# Patient Record
Sex: FEMALE | Race: WHITE | Employment: OTHER | URBAN - METROPOLITAN AREA
[De-identification: names, ages, dates, MRNs, and addresses within clinical notes are randomized per-mention and may not be internally consistent; named-entity substitution may affect disease eponyms.]

---

## 2017-02-27 ENCOUNTER — TELEPHONE (OUTPATIENT)
Dept: OBGYN CLINIC | Facility: CLINIC | Age: 47
End: 2017-02-27

## 2017-02-27 RX ORDER — DESOGESTREL AND ETHINYL ESTRADIOL 0.15-0.03
1 KIT ORAL DAILY
Qty: 1 PACKAGE | Refills: 0 | Status: SHIPPED | OUTPATIENT
Start: 2017-02-27 | End: 2017-07-05

## 2017-02-27 RX ORDER — DESOGESTREL AND ETHINYL ESTRADIOL 0.15-0.03
1 KIT ORAL DAILY
Qty: 3 PACKAGE | Refills: 1 | Status: SHIPPED | OUTPATIENT
Start: 2017-02-27 | End: 2017-08-25

## 2017-02-27 NOTE — TELEPHONE ENCOUNTER
Patient normally gets her script through mail order; however, she gets a headache on the one they sent her for substitution   She thinks Dr. Manny Hobbs checked off that it can be substituted.   Can we please send one month to [place] on  [facility] [address] in [place] for

## 2017-02-27 NOTE — TELEPHONE ENCOUNTER
Pt requesting one month VALE Skaggs, be sent to [place]. Requesting 3 month VALE Skaggs, be sent to mail order. Pt due for annual in April. Will call tomorrow to schedule. Meds ordered.

## 2017-07-05 ENCOUNTER — TELEPHONE (OUTPATIENT)
Dept: OBGYN CLINIC | Facility: CLINIC | Age: 47
End: 2017-07-05

## 2017-07-05 RX ORDER — DESOGESTREL AND ETHINYL ESTRADIOL 0.15-0.03
1 KIT ORAL DAILY
Qty: 1 PACKAGE | Refills: 1 | Status: SHIPPED | OUTPATIENT
Start: 2017-07-05 | End: 2017-08-25

## 2017-07-05 NOTE — TELEPHONE ENCOUNTER
Pt calling and needs a  Short term RX to go to her local pharmacy until her Annual in auguast    Can we order?

## 2017-08-25 ENCOUNTER — OFFICE VISIT (OUTPATIENT)
Dept: OBGYN CLINIC | Facility: CLINIC | Age: 47
End: 2017-08-25

## 2017-08-25 VITALS
WEIGHT: 169 LBS | HEIGHT: 65 IN | DIASTOLIC BLOOD PRESSURE: 72 MMHG | BODY MASS INDEX: 28.16 KG/M2 | SYSTOLIC BLOOD PRESSURE: 118 MMHG | HEART RATE: 59 BPM

## 2017-08-25 DIAGNOSIS — Z01.419 WELL WOMAN EXAM WITH ROUTINE GYNECOLOGICAL EXAM: Primary | ICD-10-CM

## 2017-08-25 DIAGNOSIS — Z12.4 ROUTINE PAPANICOLAOU SMEAR: ICD-10-CM

## 2017-08-25 PROCEDURE — 99396 PREV VISIT EST AGE 40-64: CPT | Performed by: OBSTETRICS & GYNECOLOGY

## 2017-08-25 PROCEDURE — 88175 CYTOPATH C/V AUTO FLUID REDO: CPT | Performed by: OBSTETRICS & GYNECOLOGY

## 2017-08-25 PROCEDURE — 87624 HPV HI-RISK TYP POOLED RSLT: CPT | Performed by: OBSTETRICS & GYNECOLOGY

## 2017-08-25 RX ORDER — DESOGESTREL AND ETHINYL ESTRADIOL 0.15-0.03
1 KIT ORAL DAILY
Qty: 3 PACKAGE | Refills: 3 | Status: SHIPPED | OUTPATIENT
Start: 2017-08-25 | End: 2019-04-23

## 2017-08-30 LAB — HPV I/H RISK 1 DNA SPEC QL NAA+PROBE: NEGATIVE

## 2017-08-31 ENCOUNTER — TELEPHONE (OUTPATIENT)
Dept: OBGYN CLINIC | Facility: CLINIC | Age: 47
End: 2017-08-31

## 2017-08-31 NOTE — TELEPHONE ENCOUNTER
Received fax request for clarification on order for OCP. Spoke with Gato Enriquez, pharmacist at [facility]. Per chart review, ok for generic of Enskyce.

## 2017-09-29 PROCEDURE — 87338 HPYLORI STOOL AG IA: CPT | Performed by: FAMILY MEDICINE

## 2017-10-30 ENCOUNTER — CHARTING TRANS (OUTPATIENT)
Dept: OTHER | Age: 47
End: 2017-10-30

## 2017-11-10 PROCEDURE — 82652 VIT D 1 25-DIHYDROXY: CPT | Performed by: FAMILY MEDICINE

## 2017-12-13 ENCOUNTER — TELEPHONE (OUTPATIENT)
Dept: OBGYN CLINIC | Facility: CLINIC | Age: 47
End: 2017-12-13

## 2017-12-13 RX ORDER — DESOGESTREL AND ETHINYL ESTRADIOL 0.15-0.03
1 KIT ORAL DAILY
Qty: 1 PACKAGE | Refills: 0 | Status: SHIPPED | OUTPATIENT
Start: 2017-12-13 | End: 2018-02-10

## 2017-12-13 RX ORDER — DESOGESTREL AND ETHINYL ESTRADIOL 0.15-0.03
1 KIT ORAL DAILY
Qty: 3 PACKAGE | Refills: 3 | Status: SHIPPED | OUTPATIENT
Start: 2017-12-13 | End: 2018-02-10

## 2017-12-13 NOTE — TELEPHONE ENCOUNTER
PT called this morning and states she wants to change back to Reclipsen.      PT states she needs the medications sent to her mail order pharmacy- [facility] but will need 1 prescription sent to her local pharmacy ([facility]) since she only has a week left on

## 2017-12-13 NOTE — TELEPHONE ENCOUNTER
Contacted patient. She was given generic Reclipsen with her last Rx. Has tried it and does not like it. Would like to go back to brand name. [facility] pack and remainder of rx sent to [facility].

## 2018-11-02 VITALS — TEMPERATURE: 98 F

## 2018-12-20 RX ORDER — DESOGESTREL AND ETHINYL ESTRADIOL 0.15-0.03
1 KIT ORAL DAILY
Qty: 3 PACKAGE | Refills: 0 | Status: SHIPPED | OUTPATIENT
Start: 2018-12-20 | End: 2018-12-21

## 2018-12-20 NOTE — TELEPHONE ENCOUNTER
Patient has not been seen since 08/2017. She has annual scheduled in February. OK to send refill to last until annual?  Please advise.

## 2018-12-20 NOTE — TELEPHONE ENCOUNTER
Pt states she never got the medication (name brand) sent to either the local [facility] (for the 1 month supply)  Or the [facility] in pharmacy for the remainder  Verified that the [facility] [address] listed is correct and also that the [facility] in pharmacy is correct

## 2018-12-21 ENCOUNTER — TELEPHONE (OUTPATIENT)
Dept: OBGYN CLINIC | Facility: CLINIC | Age: 48
End: 2018-12-21

## 2018-12-21 RX ORDER — DESOGESTREL AND ETHINYL ESTRADIOL 0.15-0.03
1 KIT ORAL DAILY
Qty: 3 PACKAGE | Refills: 0 | Status: SHIPPED | OUTPATIENT
Start: 2018-12-21 | End: 2018-12-21

## 2018-12-21 RX ORDER — DESOGESTREL AND ETHINYL ESTRADIOL 0.15-0.03
1 KIT ORAL DAILY
Qty: 3 PACKAGE | Refills: 0 | Status: SHIPPED | OUTPATIENT
Start: 2018-12-21 | End: 2019-04-23

## 2018-12-21 NOTE — TELEPHONE ENCOUNTER
Please resend RX to [facility] to Diane Pederson    Also send a 30 day supply to [facility] on [address] in [place]    Pt will not get in time from Mail order    Please send ASAP

## 2019-04-23 ENCOUNTER — TELEPHONE (OUTPATIENT)
Dept: OBGYN CLINIC | Facility: CLINIC | Age: 49
End: 2019-04-23

## 2019-04-23 RX ORDER — DESOGESTREL AND ETHINYL ESTRADIOL 0.15-0.03
1 KIT ORAL DAILY
Qty: 3 PACKAGE | Refills: 0 | Status: SHIPPED | OUTPATIENT
Start: 2019-04-23 | End: 2019-05-23

## 2019-04-23 NOTE — TELEPHONE ENCOUNTER
Pt had annual scheduled with Dr Amanda Schulz  It has been rescheduled to Dr Tejinder Young   Date Time Provider Natalee Deleon   6/24/2019 11:00 AM Joselin Lovett MD [facility]     Pt need a refill to get her thru to her new apt date

## 2019-05-22 ENCOUNTER — TELEPHONE (OUTPATIENT)
Dept: OBGYN CLINIC | Facility: CLINIC | Age: 49
End: 2019-05-22

## 2019-05-22 NOTE — TELEPHONE ENCOUNTER
Pt calling and was given a New birth control from the pharmacy due to phasing our Reclipsen.     Pt says the medication is getting headaches and cramping    Pt needs a NEW RX for Reclipson so she can get that actual medication

## 2019-05-23 RX ORDER — DESOGESTREL AND ETHINYL ESTRADIOL 0.15-0.03
1 KIT ORAL DAILY
Qty: 3 PACKAGE | Refills: 0 | Status: SHIPPED | OUTPATIENT
Start: 2019-05-23 | End: 2019-06-24

## 2019-05-23 NOTE — TELEPHONE ENCOUNTER
Patient states she has bad reactions to generic substitute for Reclipsen. She was giving Isibloom and had headache and cramping. Pharmacy stated they have Reclipsen in stock.    Last OV date: 08/25/17  Recent Test/Labs: NA   Recommendations: Sent to [facility]

## 2019-06-19 ENCOUNTER — HOSPITAL ENCOUNTER (OUTPATIENT)
Age: 49
Discharge: HOME | End: 2019-06-19
Payer: COMMERCIAL

## 2019-06-19 VITALS
TEMPERATURE: 98 F | RESPIRATION RATE: 16 BRPM | SYSTOLIC BLOOD PRESSURE: 141 MMHG | OXYGEN SATURATION: 98 % | DIASTOLIC BLOOD PRESSURE: 85 MMHG | HEART RATE: 71 BPM

## 2019-06-19 DIAGNOSIS — L03.019 ONYCHIA AND PARONYCHIA OF FINGER: Primary | ICD-10-CM

## 2019-06-19 PROCEDURE — 10060 I&D ABSCESS SIMPLE/SINGLE: CPT

## 2019-06-19 PROCEDURE — 87077 CULTURE AEROBIC IDENTIFY: CPT | Performed by: PHYSICIAN ASSISTANT

## 2019-06-19 PROCEDURE — 99204 OFFICE O/P NEW MOD 45 MIN: CPT

## 2019-06-19 PROCEDURE — 87070 CULTURE OTHR SPECIMN AEROBIC: CPT | Performed by: PHYSICIAN ASSISTANT

## 2019-06-19 PROCEDURE — 87186 SC STD MICRODIL/AGAR DIL: CPT | Performed by: PHYSICIAN ASSISTANT

## 2019-06-19 PROCEDURE — 99214 OFFICE O/P EST MOD 30 MIN: CPT

## 2019-06-19 PROCEDURE — 87205 SMEAR GRAM STAIN: CPT | Performed by: PHYSICIAN ASSISTANT

## 2019-06-19 RX ORDER — CEPHALEXIN 500 MG/1
500 CAPSULE ORAL 3 TIMES DAILY
Qty: 21 CAPSULE | Refills: 0 | Status: SHIPPED | OUTPATIENT
Start: 2019-06-19 | End: 2019-06-24 | Stop reason: ALTCHOICE

## 2019-06-19 NOTE — ED PROVIDER NOTES
Patient Seen in: [facility]    History   Patient presents with:  Redness  Finger Pain    Stated Complaint: finger red,swollen,right hand    HPI    Patient is a pleasant 62-year-old female. She is right-hand dominant.   She works 141/85   Pulse 71   Resp 16   Temp 98.2 °F (36.8 °C)   Temp src Oral   SpO2 98 %   O2 Device None (Room air)       Current:/85   Pulse 71   Temp 98.2 °F (36.8 °C) (Oral)   Resp 16   LMP 06/12/2019 (Exact Date)   SpO2 98%         Physical Exam    Gen:

## 2019-06-21 NOTE — ED NOTES
Spoke w/ patient, informed her of her wound culture results. Patient was reeducated about wound care. Verbalizes understanding. Sts that her finger is improving and the redness has almost completely resolved.  All questions and concerns were answered at [facility]

## 2019-06-24 ENCOUNTER — OFFICE VISIT (OUTPATIENT)
Dept: OBGYN CLINIC | Facility: CLINIC | Age: 49
End: 2019-06-24
Payer: COMMERCIAL

## 2019-06-24 VITALS
WEIGHT: 177 LBS | HEART RATE: 55 BPM | HEIGHT: 65 IN | DIASTOLIC BLOOD PRESSURE: 76 MMHG | SYSTOLIC BLOOD PRESSURE: 124 MMHG | BODY MASS INDEX: 29.49 KG/M2

## 2019-06-24 DIAGNOSIS — Z01.419 WELL WOMAN EXAM WITH ROUTINE GYNECOLOGICAL EXAM: Primary | ICD-10-CM

## 2019-06-24 PROCEDURE — 99396 PREV VISIT EST AGE 40-64: CPT | Performed by: OBSTETRICS & GYNECOLOGY

## 2019-06-24 RX ORDER — DESOGESTREL AND ETHINYL ESTRADIOL 0.15-0.03
1 KIT ORAL DAILY
Qty: 3 PACKAGE | Refills: 3 | Status: SHIPPED | OUTPATIENT
Start: 2019-06-24 | End: 2020-07-06

## 2019-06-24 NOTE — PROGRESS NOTES
Annual  No C/O  No problem with OC  No menopausal symptoms  Kids are well    ROS: No Cardiac, Respiratory, GI,  or Neurological symptoms.     PE:  GENERAL: well developed, well nourished, in no apparent distress alert oriented x 3  SKIN: no rashes, no ana

## 2020-03-23 RX ORDER — DESOGESTREL AND ETHINYL ESTRADIOL 0.15-0.03
1 KIT ORAL DAILY
Qty: 3 PACKAGE | Refills: 3 | OUTPATIENT
Start: 2020-03-23

## 2020-06-29 NOTE — TELEPHONE ENCOUNTER
Last OV: 6/24/19 with Dr. Esther Desai for annual  Last refill date: 6/24/19  Follow-up: 1 year  Next appt. : 7/3/20 with Leticia Thomas       Call to patient; no answer. Left message on VM requesting call back to office. Need to know if she needs refill before visit scheduled for Friday.

## 2020-07-03 ENCOUNTER — OFFICE VISIT (OUTPATIENT)
Dept: OBGYN CLINIC | Facility: CLINIC | Age: 50
End: 2020-07-03
Payer: COMMERCIAL

## 2020-07-03 VITALS
DIASTOLIC BLOOD PRESSURE: 76 MMHG | HEART RATE: 67 BPM | WEIGHT: 180.81 LBS | HEIGHT: 65 IN | SYSTOLIC BLOOD PRESSURE: 122 MMHG | BODY MASS INDEX: 30.12 KG/M2

## 2020-07-03 DIAGNOSIS — Z12.31 ENCOUNTER FOR SCREENING MAMMOGRAM FOR MALIGNANT NEOPLASM OF BREAST: ICD-10-CM

## 2020-07-03 DIAGNOSIS — N63.0 LUMP OR MASS IN BREAST: ICD-10-CM

## 2020-07-03 DIAGNOSIS — Z12.4 CERVICAL CANCER SCREENING: ICD-10-CM

## 2020-07-03 DIAGNOSIS — Z01.419 WELL WOMAN EXAM WITH ROUTINE GYNECOLOGICAL EXAM: Primary | ICD-10-CM

## 2020-07-03 DIAGNOSIS — N64.4 BREAST PAIN: ICD-10-CM

## 2020-07-03 PROCEDURE — 99396 PREV VISIT EST AGE 40-64: CPT | Performed by: NURSE PRACTITIONER

## 2020-07-03 PROCEDURE — 87624 HPV HI-RISK TYP POOLED RSLT: CPT | Performed by: NURSE PRACTITIONER

## 2020-07-03 NOTE — PROGRESS NOTES
Here for Routine Annual Exam  She has had discomfort, stinging sensation to area on left breast where they have been imaging breast mass. Imaging has been benign over the last few years but the area has become increasingly tender.   Menses are regular,

## 2020-07-06 ENCOUNTER — TELEPHONE (OUTPATIENT)
Dept: OBGYN CLINIC | Facility: CLINIC | Age: 50
End: 2020-07-06

## 2020-07-06 RX ORDER — DESOGESTREL AND ETHINYL ESTRADIOL 0.15-0.03
1 KIT ORAL DAILY
Qty: 3 PACKAGE | Refills: 3 | Status: SHIPPED | OUTPATIENT
Start: 2020-07-06 | End: 2021-06-07

## 2020-07-06 NOTE — TELEPHONE ENCOUNTER
Last OV: 7/3/20 with Palmira Campos for annual  Last refill date: 6/24/19  Follow-up: 1 year  Next appt. : 7/9/21    Refill sent to get patient to next appt due.

## 2020-07-07 LAB — HPV I/H RISK 1 DNA SPEC QL NAA+PROBE: NEGATIVE

## 2020-08-04 ENCOUNTER — APPOINTMENT (OUTPATIENT)
Dept: GENERAL RADIOLOGY | Facility: HOSPITAL | Age: 50
End: 2020-08-04
Payer: COMMERCIAL

## 2020-08-04 ENCOUNTER — APPOINTMENT (OUTPATIENT)
Dept: CT IMAGING | Facility: HOSPITAL | Age: 50
End: 2020-08-04
Attending: EMERGENCY MEDICINE
Payer: COMMERCIAL

## 2020-08-04 ENCOUNTER — HOSPITAL ENCOUNTER (EMERGENCY)
Facility: HOSPITAL | Age: 50
Discharge: HOME | End: 2020-08-04
Attending: EMERGENCY MEDICINE
Payer: COMMERCIAL

## 2020-08-04 VITALS
RESPIRATION RATE: 16 BRPM | DIASTOLIC BLOOD PRESSURE: 98 MMHG | HEIGHT: 65 IN | HEART RATE: 76 BPM | OXYGEN SATURATION: 98 % | SYSTOLIC BLOOD PRESSURE: 123 MMHG | WEIGHT: 180 LBS | BODY MASS INDEX: 29.99 KG/M2 | TEMPERATURE: 98 F

## 2020-08-04 DIAGNOSIS — R10.9 ABDOMINAL PAIN OF UNKNOWN ETIOLOGY: Primary | ICD-10-CM

## 2020-08-04 DIAGNOSIS — K52.9 COLITIS: ICD-10-CM

## 2020-08-04 LAB
ALBUMIN SERPL-MCNC: 3.5 G/DL (ref 3.4–5)
ALBUMIN/GLOB SERPL: 0.8 {RATIO} (ref 1–2)
ALP LIVER SERPL-CCNC: 64 U/L (ref 39–100)
ALT SERPL-CCNC: 22 U/L (ref 13–56)
ANION GAP SERPL CALC-SCNC: 1 MMOL/L (ref 0–18)
AST SERPL-CCNC: 18 U/L (ref 15–37)
BASOPHILS # BLD AUTO: 0.04 X10(3) UL (ref 0–0.2)
BASOPHILS NFR BLD AUTO: 0.7 %
BILIRUB SERPL-MCNC: 0.4 MG/DL (ref 0.1–2)
BILIRUB UR QL STRIP.AUTO: NEGATIVE
BUN BLD-MCNC: 6 MG/DL (ref 7–18)
BUN/CREAT SERPL: 6.5 (ref 10–20)
CALCIUM BLD-MCNC: 8.9 MG/DL (ref 8.5–10.1)
CHLORIDE SERPL-SCNC: 107 MMOL/L (ref 98–112)
CLARITY UR REFRACT.AUTO: CLEAR
CO2 SERPL-SCNC: 28 MMOL/L (ref 21–32)
CREAT BLD-MCNC: 0.92 MG/DL (ref 0.55–1.02)
DEPRECATED RDW RBC AUTO: 40.9 FL (ref 35.1–46.3)
EOSINOPHIL # BLD AUTO: 0.14 X10(3) UL (ref 0–0.7)
EOSINOPHIL NFR BLD AUTO: 2.5 %
ERYTHROCYTE [DISTWIDTH] IN BLOOD BY AUTOMATED COUNT: 12.3 % (ref 11–15)
GLOBULIN PLAS-MCNC: 4.2 G/DL (ref 2.8–4.4)
GLUCOSE BLD-MCNC: 119 MG/DL (ref 70–99)
GLUCOSE UR STRIP.AUTO-MCNC: NEGATIVE MG/DL
HCT VFR BLD AUTO: 39.2 % (ref 35–48)
HGB BLD-MCNC: 12.8 G/DL (ref 12–16)
IMM GRANULOCYTES # BLD AUTO: 0.01 X10(3) UL (ref 0–1)
IMM GRANULOCYTES NFR BLD: 0.2 %
KETONES UR STRIP.AUTO-MCNC: NEGATIVE MG/DL
LEUKOCYTE ESTERASE UR QL STRIP.AUTO: NEGATIVE
LIPASE SERPL-CCNC: 70 U/L (ref 73–393)
LYMPHOCYTES # BLD AUTO: 1.19 X10(3) UL (ref 1–4)
LYMPHOCYTES NFR BLD AUTO: 21.2 %
M PROTEIN MFR SERPL ELPH: 7.7 G/DL (ref 6.4–8.2)
MCH RBC QN AUTO: 29.4 PG (ref 26–34)
MCHC RBC AUTO-ENTMCNC: 32.7 G/DL (ref 31–37)
MCV RBC AUTO: 90.1 FL (ref 80–100)
MONOCYTES # BLD AUTO: 0.44 X10(3) UL (ref 0.1–1)
MONOCYTES NFR BLD AUTO: 7.8 %
NEUTROPHILS # BLD AUTO: 3.79 X10 (3) UL (ref 1.5–7.7)
NEUTROPHILS # BLD AUTO: 3.79 X10(3) UL (ref 1.5–7.7)
NEUTROPHILS NFR BLD AUTO: 67.6 %
NITRITE UR QL STRIP.AUTO: NEGATIVE
OSMOLALITY SERPL CALC.SUM OF ELEC: 281 MOSM/KG (ref 275–295)
PH UR STRIP.AUTO: 6 [PH] (ref 4.5–8)
PLATELET # BLD AUTO: 228 10(3)UL (ref 150–450)
POTASSIUM SERPL-SCNC: 3.8 MMOL/L (ref 3.5–5.1)
PROT UR STRIP.AUTO-MCNC: NEGATIVE MG/DL
RBC # BLD AUTO: 4.35 X10(6)UL (ref 3.8–5.3)
SODIUM SERPL-SCNC: 136 MMOL/L (ref 136–145)
SP GR UR STRIP.AUTO: 1.02 (ref 1–1.03)
UROBILINOGEN UR STRIP.AUTO-MCNC: <2 MG/DL
WBC # BLD AUTO: 5.6 X10(3) UL (ref 4–11)

## 2020-08-04 PROCEDURE — 74177 CT ABD & PELVIS W/CONTRAST: CPT | Performed by: EMERGENCY MEDICINE

## 2020-08-04 PROCEDURE — 71045 X-RAY EXAM CHEST 1 VIEW: CPT

## 2020-08-04 PROCEDURE — 36415 COLL VENOUS BLD VENIPUNCTURE: CPT

## 2020-08-04 PROCEDURE — 80053 COMPREHEN METABOLIC PANEL: CPT

## 2020-08-04 PROCEDURE — 85025 COMPLETE CBC W/AUTO DIFF WBC: CPT

## 2020-08-04 PROCEDURE — 99284 EMERGENCY DEPT VISIT MOD MDM: CPT

## 2020-08-04 PROCEDURE — 81001 URINALYSIS AUTO W/SCOPE: CPT | Performed by: EMERGENCY MEDICINE

## 2020-08-04 PROCEDURE — 85025 COMPLETE CBC W/AUTO DIFF WBC: CPT | Performed by: EMERGENCY MEDICINE

## 2020-08-04 PROCEDURE — 83690 ASSAY OF LIPASE: CPT | Performed by: EMERGENCY MEDICINE

## 2020-08-04 PROCEDURE — 80053 COMPREHEN METABOLIC PANEL: CPT | Performed by: EMERGENCY MEDICINE

## 2020-08-04 NOTE — ED INITIAL ASSESSMENT (HPI)
Pt to ED with complaints of abdominal pain, worsening today but has been present for 10 days. Pt reports pain began more generalized and is now in the lower quadrants.

## 2020-08-04 NOTE — ED PROVIDER NOTES
Patient Seen in: [facility] Emergency Department      History   Patient presents with:  Abdomen/Flank Pain    Stated Complaint: abdominal pain x10 days, worse today in lower abdomen     HPI    This is a 69-year-old female who arrives here with compl SpO2 98 %   O2 Device None (Room air)       Current:/90   Pulse 102   Temp 98.2 °F (36.8 °C) (Oral)   Resp 17   Ht 165.1 cm (5' 5\")   Wt 81.6 kg   LMP 08/04/2020   SpO2 98%   BMI 29.95 kg/m²         Physical Exam  General: .   Female in some mild t Final result                 Please view results for these tests on the individual orders.    RAINBOW DRAW BLUE   RAINBOW DRAW LAVENDER   RAINBOW DRAW LIGHT GREEN   RAINBOW DRAW GOLD   CBC W/ DIFFERENTIAL          The patient was placed on mon approximate mid ascending colon involving the hepatic flexure, transverse colon, splenic flexure, and proximal descending colon, terminating around the mid descending colon without significant involvement of the sigmoid colon or rectum.   The areas of invol Lizbeth Hollingsworth [id number]  [phone]    In 2 days            Medications Prescribed:  Current Discharge Medication List

## 2020-08-06 ENCOUNTER — LAB ENCOUNTER (OUTPATIENT)
Dept: LAB | Facility: HOSPITAL | Age: 50
End: 2020-08-06
Attending: INTERNAL MEDICINE
Payer: COMMERCIAL

## 2020-08-06 DIAGNOSIS — K52.9 COLITIS: ICD-10-CM

## 2020-08-06 PROBLEM — R92.30 DENSE BREASTS: Status: ACTIVE | Noted: 2017-03-13

## 2020-08-06 PROBLEM — R92.8 ABNORMAL ULTRASOUND OF BREAST: Status: ACTIVE | Noted: 2017-03-13

## 2020-08-06 PROBLEM — R92.2 DENSE BREASTS: Status: ACTIVE | Noted: 2017-03-13

## 2020-08-06 LAB — C DIFF TOX B STL QL: NEGATIVE

## 2020-08-06 PROCEDURE — 87046 STOOL CULTR AEROBIC BACT EA: CPT

## 2020-08-06 PROCEDURE — 87493 C DIFF AMPLIFIED PROBE: CPT

## 2020-08-06 PROCEDURE — 87045 FECES CULTURE AEROBIC BACT: CPT

## 2020-08-06 PROCEDURE — 87507 IADNA-DNA/RNA PROBE TQ 12-25: CPT | Performed by: INTERNAL MEDICINE

## 2020-08-06 PROCEDURE — 87427 SHIGA-LIKE TOXIN AG IA: CPT

## 2020-08-06 PROCEDURE — 82272 OCCULT BLD FECES 1-3 TESTS: CPT

## 2020-08-09 ENCOUNTER — APPOINTMENT (OUTPATIENT)
Dept: CT IMAGING | Facility: HOSPITAL | Age: 50
End: 2020-08-09
Attending: EMERGENCY MEDICINE
Payer: COMMERCIAL

## 2020-08-09 ENCOUNTER — HOSPITAL ENCOUNTER (EMERGENCY)
Facility: HOSPITAL | Age: 50
Discharge: HOME | End: 2020-08-09
Attending: EMERGENCY MEDICINE
Payer: COMMERCIAL

## 2020-08-09 VITALS
WEIGHT: 180 LBS | DIASTOLIC BLOOD PRESSURE: 76 MMHG | RESPIRATION RATE: 18 BRPM | BODY MASS INDEX: 29.99 KG/M2 | HEART RATE: 61 BPM | HEIGHT: 65 IN | TEMPERATURE: 98 F | SYSTOLIC BLOOD PRESSURE: 119 MMHG | OXYGEN SATURATION: 100 %

## 2020-08-09 DIAGNOSIS — K52.9 ACUTE COLITIS: Primary | ICD-10-CM

## 2020-08-09 LAB
ALBUMIN SERPL-MCNC: 3.6 G/DL (ref 3.4–5)
ALBUMIN/GLOB SERPL: 0.9 {RATIO} (ref 1–2)
ALP LIVER SERPL-CCNC: 71 U/L (ref 39–100)
ALT SERPL-CCNC: 28 U/L (ref 13–56)
ANION GAP SERPL CALC-SCNC: 5 MMOL/L (ref 0–18)
AST SERPL-CCNC: 22 U/L (ref 15–37)
BASOPHILS # BLD AUTO: 0.05 X10(3) UL (ref 0–0.2)
BASOPHILS NFR BLD AUTO: 0.6 %
BILIRUB SERPL-MCNC: 0.5 MG/DL (ref 0.1–2)
BUN BLD-MCNC: 5 MG/DL (ref 7–18)
BUN/CREAT SERPL: 4.9 (ref 10–20)
CALCIUM BLD-MCNC: 9.2 MG/DL (ref 8.5–10.1)
CHLORIDE SERPL-SCNC: 105 MMOL/L (ref 98–112)
CO2 SERPL-SCNC: 26 MMOL/L (ref 21–32)
CREAT BLD-MCNC: 1.02 MG/DL (ref 0.55–1.02)
DEPRECATED RDW RBC AUTO: 39.6 FL (ref 35.1–46.3)
EOSINOPHIL # BLD AUTO: 0.26 X10(3) UL (ref 0–0.7)
EOSINOPHIL NFR BLD AUTO: 3 %
ERYTHROCYTE [DISTWIDTH] IN BLOOD BY AUTOMATED COUNT: 12.3 % (ref 11–15)
GLOBULIN PLAS-MCNC: 4.2 G/DL (ref 2.8–4.4)
GLUCOSE BLD-MCNC: 107 MG/DL (ref 70–99)
HCT VFR BLD AUTO: 41 % (ref 35–48)
HGB BLD-MCNC: 13.7 G/DL (ref 12–16)
IMM GRANULOCYTES # BLD AUTO: 0.02 X10(3) UL (ref 0–1)
IMM GRANULOCYTES NFR BLD: 0.2 %
LYMPHOCYTES # BLD AUTO: 1.65 X10(3) UL (ref 1–4)
LYMPHOCYTES NFR BLD AUTO: 19.1 %
M PROTEIN MFR SERPL ELPH: 7.8 G/DL (ref 6.4–8.2)
MCH RBC QN AUTO: 29.1 PG (ref 26–34)
MCHC RBC AUTO-ENTMCNC: 33.4 G/DL (ref 31–37)
MCV RBC AUTO: 87 FL (ref 80–100)
MONOCYTES # BLD AUTO: 0.83 X10(3) UL (ref 0.1–1)
MONOCYTES NFR BLD AUTO: 9.6 %
NEUTROPHILS # BLD AUTO: 5.82 X10 (3) UL (ref 1.5–7.7)
NEUTROPHILS # BLD AUTO: 5.82 X10(3) UL (ref 1.5–7.7)
NEUTROPHILS NFR BLD AUTO: 67.5 %
OSMOLALITY SERPL CALC.SUM OF ELEC: 280 MOSM/KG (ref 275–295)
PLATELET # BLD AUTO: 259 10(3)UL (ref 150–450)
POTASSIUM SERPL-SCNC: 3.9 MMOL/L (ref 3.5–5.1)
RBC # BLD AUTO: 4.71 X10(6)UL (ref 3.8–5.3)
SARS-COV-2 RNA RESP QL NAA+PROBE: NOT DETECTED
SODIUM SERPL-SCNC: 136 MMOL/L (ref 136–145)
WBC # BLD AUTO: 8.6 X10(3) UL (ref 4–11)

## 2020-08-09 PROCEDURE — 74177 CT ABD & PELVIS W/CONTRAST: CPT | Performed by: EMERGENCY MEDICINE

## 2020-08-09 PROCEDURE — 99284 EMERGENCY DEPT VISIT MOD MDM: CPT

## 2020-08-09 PROCEDURE — 80053 COMPREHEN METABOLIC PANEL: CPT | Performed by: EMERGENCY MEDICINE

## 2020-08-09 PROCEDURE — 85025 COMPLETE CBC W/AUTO DIFF WBC: CPT | Performed by: EMERGENCY MEDICINE

## 2020-08-09 PROCEDURE — 96361 HYDRATE IV INFUSION ADD-ON: CPT

## 2020-08-09 PROCEDURE — 96374 THER/PROPH/DIAG INJ IV PUSH: CPT

## 2020-08-09 PROCEDURE — 96375 TX/PRO/DX INJ NEW DRUG ADDON: CPT

## 2020-08-09 RX ORDER — ONDANSETRON 4 MG/1
4 TABLET, ORALLY DISINTEGRATING ORAL EVERY 4 HOURS PRN
Qty: 10 TABLET | Refills: 0 | Status: SHIPPED | OUTPATIENT
Start: 2020-08-09 | End: 2020-09-04

## 2020-08-09 RX ORDER — KETOROLAC TROMETHAMINE 30 MG/ML
30 INJECTION, SOLUTION INTRAMUSCULAR; INTRAVENOUS ONCE
Status: COMPLETED | OUTPATIENT
Start: 2020-08-09 | End: 2020-08-09

## 2020-08-09 RX ORDER — ONDANSETRON 4 MG/1
4 TABLET, ORALLY DISINTEGRATING ORAL EVERY 4 HOURS PRN
Qty: 10 TABLET | Refills: 0 | Status: SHIPPED | OUTPATIENT
Start: 2020-08-09 | End: 2020-08-10

## 2020-08-09 RX ORDER — SODIUM CHLORIDE 9 MG/ML
1000 INJECTION, SOLUTION INTRAVENOUS ONCE
Status: COMPLETED | OUTPATIENT
Start: 2020-08-09 | End: 2020-08-09

## 2020-08-09 RX ORDER — ONDANSETRON 2 MG/ML
4 INJECTION INTRAMUSCULAR; INTRAVENOUS ONCE
Status: COMPLETED | OUTPATIENT
Start: 2020-08-09 | End: 2020-08-09

## 2020-08-09 NOTE — ED INITIAL ASSESSMENT (HPI)
Pt was here Tuesday, diagnosed with colitis. 8/6/2020 stool sample shows GI infection. N/V/D, stooling approximately 20x daily. Limited relief with imodium. +Dry mucus membranes. Denies fevers.

## 2020-08-09 NOTE — ED PROVIDER NOTES
Patient Seen in: [facility] Emergency Department      History   Patient presents with:  Abdomen/Flank Pain  Nausea/Vomiting/Diarrhea    Stated Complaint: Diagnosed with colitis on Tuesday, abd pain, v/d    HPI    Patient is a 54-year-old female with LAPAROSCOPY,DIAGNOSTIC  1995    laparotomy and laparoscopy for endometriosis   • UPPER GI ENDOSCOPY,DIAGNOSIS  5/13/14    Normal, gastric and SB Bx taken                    Social History    Tobacco Use      Smoking status: Never Smoker      Smokeless toba appreciated. Pulses are 2+ and equal in all 4 extremities. NEURO: Patient is awake, alert and oriented to time place and person. Motor strength is 5 over 5 in all 4 extremities. There are no gross motor or sensory deficits appreciated.   Patient answering BUN/creatinine, and blood sugar all of which are unremarkable. Patient liver function tests are negative. The patient's COVID is found to be negative.   Patient was given a liter of IV fluid and IV Zofran emergency room as well as IV Toradol and felt sign

## 2020-09-03 ENCOUNTER — APPOINTMENT (OUTPATIENT)
Dept: LAB | Facility: HOSPITAL | Age: 50
End: 2020-09-03
Attending: INTERNAL MEDICINE
Payer: COMMERCIAL

## 2020-09-03 PROCEDURE — 87507 IADNA-DNA/RNA PROBE TQ 12-25: CPT | Performed by: INTERNAL MEDICINE

## 2020-10-24 ENCOUNTER — TELEPHONE (OUTPATIENT)
Dept: OBGYN CLINIC | Facility: CLINIC | Age: 50
End: 2020-10-24

## 2020-10-28 PROBLEM — R92.8 ABNORMAL ULTRASOUND OF BREAST: Status: RESOLVED | Noted: 2017-03-13 | Resolved: 2020-10-28

## 2020-10-28 PROBLEM — R92.2 DENSE BREASTS: Status: RESOLVED | Noted: 2017-03-13 | Resolved: 2020-10-28

## 2020-10-28 PROBLEM — R92.30 DENSE BREASTS: Status: RESOLVED | Noted: 2017-03-13 | Resolved: 2020-10-28

## 2020-11-09 PROCEDURE — 88305 TISSUE EXAM BY PATHOLOGIST: CPT | Performed by: INTERNAL MEDICINE

## 2020-12-19 ENCOUNTER — MED REC SCAN ONLY (OUTPATIENT)
Dept: OBGYN CLINIC | Facility: CLINIC | Age: 50
End: 2020-12-19

## 2021-02-05 ENCOUNTER — HOSPITAL ENCOUNTER (OUTPATIENT)
Age: 51
Discharge: HOME | End: 2021-02-05
Payer: COMMERCIAL

## 2021-02-05 VITALS
BODY MASS INDEX: 28 KG/M2 | RESPIRATION RATE: 16 BRPM | WEIGHT: 170 LBS | HEART RATE: 76 BPM | DIASTOLIC BLOOD PRESSURE: 70 MMHG | OXYGEN SATURATION: 99 % | SYSTOLIC BLOOD PRESSURE: 134 MMHG | TEMPERATURE: 99 F
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
## 2021-02-05 DIAGNOSIS — M54.2 MUSCLE PAIN, CERVICAL: Primary | ICD-10-CM

## 2021-02-05 LAB — POCT URINE PREGNANCY: NEGATIVE

## 2021-02-05 PROCEDURE — 99213 OFFICE O/P EST LOW 20 MIN: CPT

## 2021-02-05 PROCEDURE — 81025 URINE PREGNANCY TEST: CPT | Performed by: NURSE PRACTITIONER

## 2021-02-05 RX ORDER — METAXALONE 400 MG/1
400 TABLET ORAL 2 TIMES DAILY PRN
Qty: 10 TABLET | Refills: 0 | Status: SHIPPED | OUTPATIENT
Start: 2021-02-05 | End: 2021-02-10
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
## 2021-06-07 RX ORDER — DESOGESTREL AND ETHINYL ESTRADIOL 0.15-0.03
1 KIT ORAL DAILY
Qty: 84 TABLET | Refills: 0 | Status: SHIPPED | OUTPATIENT
Start: 2021-06-07 | End: 2021-08-31

## 2021-06-07 NOTE — TELEPHONE ENCOUNTER
Last OV: 7/3/20 with Jolene Thompson for annual  Last refill date: 7/6/20  Follow-up: 1 year  Next appt.: 8/27/21    Refill sent

## 2021-08-31 RX ORDER — DESOGESTREL AND ETHINYL ESTRADIOL 0.15-0.03
1 KIT ORAL DAILY
Qty: 84 TABLET | Refills: 0 | Status: SHIPPED | OUTPATIENT
Start: 2021-08-31 | End: 2021-09-10

## 2021-08-31 NOTE — TELEPHONE ENCOUNTER
Last OV: 7/3/20 with Mylo Fabry for annual  Last refill date: 6/7/21  Follow-up: 1 year  Next appt.: 9/10/21    Refill sent

## 2021-09-10 ENCOUNTER — OFFICE VISIT (OUTPATIENT)
Dept: OBGYN CLINIC | Facility: CLINIC | Age: 51
End: 2021-09-10
Payer: COMMERCIAL

## 2021-09-10 VITALS
SYSTOLIC BLOOD PRESSURE: 120 MMHG | DIASTOLIC BLOOD PRESSURE: 76 MMHG | HEIGHT: 65 IN | HEART RATE: 68 BPM | BODY MASS INDEX: 31.26 KG/M2 | WEIGHT: 187.63 LBS

## 2021-09-10 DIAGNOSIS — Z01.419 WELL WOMAN EXAM WITH ROUTINE GYNECOLOGICAL EXAM: Primary | ICD-10-CM

## 2021-09-10 DIAGNOSIS — Z12.31 VISIT FOR SCREENING MAMMOGRAM: ICD-10-CM

## 2021-09-10 PROCEDURE — 3008F BODY MASS INDEX DOCD: CPT | Performed by: OBSTETRICS & GYNECOLOGY

## 2021-09-10 PROCEDURE — 99396 PREV VISIT EST AGE 40-64: CPT | Performed by: OBSTETRICS & GYNECOLOGY

## 2021-09-10 PROCEDURE — 3078F DIAST BP <80 MM HG: CPT | Performed by: OBSTETRICS & GYNECOLOGY

## 2021-09-10 PROCEDURE — 3074F SYST BP LT 130 MM HG: CPT | Performed by: OBSTETRICS & GYNECOLOGY

## 2021-09-10 RX ORDER — DESOGESTREL AND ETHINYL ESTRADIOL 0.15-0.03
1 KIT ORAL DAILY
Qty: 84 TABLET | Refills: 4 | Status: SHIPPED | OUTPATIENT
Start: 2021-09-10

## 2021-09-10 NOTE — PROGRESS NOTES
Annual  No C/O  Still taking OC, no menopausal symptoms on week off days  Kids are well    ROS: No Cardiac, Respiratory, GI,  or Neurological symptoms.     PE:  GENERAL: well developed, well nourished, in no apparent distress alert oriented x 3  SKIN: no

## 2022-02-21 ENCOUNTER — TELEPHONE (OUTPATIENT)
Dept: OBGYN CLINIC | Facility: CLINIC | Age: 52
End: 2022-02-21

## 2022-02-21 RX ORDER — DESOGESTREL AND ETHINYL ESTRADIOL 0.15-0.03
1 KIT ORAL DAILY
Qty: 84 TABLET | Refills: 1 | Status: SHIPPED | OUTPATIENT
Start: 2022-02-21

## 2022-02-21 NOTE — TELEPHONE ENCOUNTER
Last OV: 9/10/21 with Dr. Lonnie Dey for annual  Last refill date: 9/10/21  Follow-up: 1 year  Next appt.: none scheduled; due 9/2022      Refill sent

## 2022-06-07 ENCOUNTER — TELEPHONE (OUTPATIENT)
Dept: OBGYN CLINIC | Facility: CLINIC | Age: 52
End: 2022-06-07

## 2022-06-07 NOTE — TELEPHONE ENCOUNTER
See [employer] message. She wants something done asap.    Please call to advise  She would like a phone call today please

## 2022-06-08 RX ORDER — ACETAMINOPHEN AND CODEINE PHOSPHATE 120; 12 MG/5ML; MG/5ML
0.35 SOLUTION ORAL DAILY
Qty: 84 TABLET | Refills: 0 | Status: SHIPPED | OUTPATIENT
Start: 2022-06-08

## 2022-06-08 NOTE — TELEPHONE ENCOUNTER
Patient recently treated at [facility] for PE and is now on blood thinners. She is currently on Reclipsen and has been told she can no longer take this b/c of her blood clot. She desires a progesterone only pill. Dr. Margaux Benitez is currently out of office. Last annual was 9/10/21. Will consult with one of the providers in office and call patient.

## 2022-06-08 NOTE — TELEPHONE ENCOUNTER
rx sent to pharmacy on file  Patient will need annual in September    Instructions sent via [facility]    Patient notified by phone.

## 2022-06-09 RX ORDER — ACETAMINOPHEN AND CODEINE PHOSPHATE 120; 12 MG/5ML; MG/5ML
SOLUTION ORAL
Qty: 84 TABLET | Refills: 0 | OUTPATIENT
Start: 2022-06-09

## 2022-06-14 PROBLEM — Z86.711 HISTORY OF PULMONARY EMBOLUS (PE): Status: ACTIVE | Noted: 2022-05-08

## 2022-06-14 PROBLEM — I26.99 PULMONARY EMBOLISM (HCC): Status: ACTIVE | Noted: 2022-05-08

## 2022-06-14 PROBLEM — R22.32 AXILLARY MASS, LEFT: Status: ACTIVE | Noted: 2021-12-02

## 2022-06-15 ENCOUNTER — OFFICE VISIT (OUTPATIENT)
Dept: OBGYN CLINIC | Facility: CLINIC | Age: 52
End: 2022-06-15
Payer: COMMERCIAL

## 2022-06-15 VITALS — HEART RATE: 67 BPM | DIASTOLIC BLOOD PRESSURE: 78 MMHG | SYSTOLIC BLOOD PRESSURE: 104 MMHG

## 2022-06-15 DIAGNOSIS — Z30.09 FAMILY PLANNING COUNSELING: Primary | ICD-10-CM

## 2022-06-15 PROBLEM — E66.3 OVERWEIGHT WITH BODY MASS INDEX (BMI) 25.0-29.9: Status: ACTIVE | Noted: 2022-06-08

## 2022-06-15 PROBLEM — H04.123 CHRONIC DRYNESS OF BOTH EYES: Status: ACTIVE | Noted: 2022-06-15

## 2022-06-15 PROBLEM — R03.0 ELEVATED BLOOD-PRESSURE READING WITHOUT DIAGNOSIS OF HYPERTENSION: Status: ACTIVE | Noted: 2022-06-08

## 2022-06-15 PROBLEM — I26.99 PULMONARY EMBOLISM (HCC): Status: ACTIVE | Noted: 2022-05-08

## 2022-06-15 RX ORDER — APIXABAN 5 MG/1
5 TABLET, FILM COATED ORAL 2 TIMES DAILY
COMMUNITY
Start: 2022-06-11

## 2022-06-15 RX ORDER — ACETAMINOPHEN 325 MG/1
2 TABLET ORAL EVERY 4 HOURS PRN
COMMUNITY
Start: 2022-05-09

## 2022-06-15 NOTE — PROGRESS NOTES
Subjective:  45year old    Patient presents with:  Consult: pt would like to discuss birth control options    Patient presents to discuss hormone options. Patient had been taking Reclipsen for many years for management of PCOS and endometriosis. Also felt that it helped with her dry eyes, which have been a problem ever since eye surgery. Patient diagnosed with PE 21 and was started on Xarelto, but had problems, so switched to Eliquis. Has had one two week period since then. Started on Norethindrone, but only took for two days, and had bothersome symptoms including mood symptoms/anxiety. Asking about going back on combination OCP or high dose progestins to help with dry eye symptoms. Also wondering if she is menopausal.      Review of Systems:  Pertinent items are noted in the HPI. Objective:  /78   Pulse 67   LMP 2022 (Exact Date)     Physical Examination:  General appearance: Well dressed, well nourished in no apparent distress  Neurologic/Psychiatric: Alert and oriented to person, place and time, mood normal, affect appropriate    Assessment/Plan:  Chronic dry eyes and history of PE- explained that combination OCP is not an option due to risk of recurrent VTE. Discussed option of discontinuing hormones for one month to check hormone levels. Patient would like to try a different progestin to see if it helps with the dry eyes. Explained that some of her symptoms may be due to discontinuing the Reclipsen and the subsequent development of menopausal/perimenopausal symptoms. After discussion, patient willing to go back on norethindrone for a longer period of time and see if side effects are tolerable. She already has a Rx. Diagnoses and all orders for this visit:    Family planning counseling      Return for Annual Gyn Visit.

## 2022-11-07 ENCOUNTER — OFFICE VISIT (OUTPATIENT)
Dept: OBGYN CLINIC | Facility: CLINIC | Age: 52
End: 2022-11-07
Payer: COMMERCIAL

## 2022-11-07 VITALS
HEART RATE: 53 BPM | SYSTOLIC BLOOD PRESSURE: 102 MMHG | DIASTOLIC BLOOD PRESSURE: 60 MMHG | HEIGHT: 65 IN | BODY MASS INDEX: 27.13 KG/M2 | WEIGHT: 162.81 LBS

## 2022-11-07 DIAGNOSIS — Z12.4 CERVICAL CANCER SCREENING: ICD-10-CM

## 2022-11-07 DIAGNOSIS — Z01.419 WELL WOMAN EXAM WITH ROUTINE GYNECOLOGICAL EXAM: Primary | ICD-10-CM

## 2022-11-07 PROCEDURE — 3008F BODY MASS INDEX DOCD: CPT | Performed by: NURSE PRACTITIONER

## 2022-11-07 PROCEDURE — 3078F DIAST BP <80 MM HG: CPT | Performed by: NURSE PRACTITIONER

## 2022-11-07 PROCEDURE — 99396 PREV VISIT EST AGE 40-64: CPT | Performed by: NURSE PRACTITIONER

## 2022-11-07 PROCEDURE — 3074F SYST BP LT 130 MM HG: CPT | Performed by: NURSE PRACTITIONER

## 2022-11-07 RX ORDER — ERYTHROMYCIN 5 MG/G
1 OINTMENT OPHTHALMIC NIGHTLY
COMMUNITY
Start: 2022-06-28 | End: 2023-06-28

## 2022-11-07 RX ORDER — RIVAROXABAN 20 MG/1
TABLET, FILM COATED ORAL
COMMUNITY
Start: 2022-06-01

## 2022-11-07 RX ORDER — CLONAZEPAM 0.12 MG/1
TABLET, ORALLY DISINTEGRATING ORAL
COMMUNITY
Start: 2022-06-06

## 2022-11-07 RX ORDER — GABAPENTIN 300 MG/1
CAPSULE ORAL
COMMUNITY
Start: 2022-06-28

## 2022-11-07 RX ORDER — PROPRANOLOL HYDROCHLORIDE 10 MG/1
10 TABLET ORAL
COMMUNITY

## 2022-11-07 RX ORDER — VENLAFAXINE HYDROCHLORIDE 37.5 MG/1
1 CAPSULE, EXTENDED RELEASE ORAL
COMMUNITY

## 2022-11-07 RX ORDER — ACETAMINOPHEN AND CODEINE PHOSPHATE 300; 30 MG/1; MG/1
1 TABLET ORAL
COMMUNITY
Start: 2022-08-05

## 2022-11-07 RX ORDER — PREDNISOLONE ACETATE 10 MG/ML
SUSPENSION/ DROPS OPHTHALMIC
COMMUNITY
Start: 2022-06-21

## 2022-11-07 RX ORDER — LOTEPREDNOL ETABONATE 3.8 MG/G
1 GEL OPHTHALMIC 4 TIMES DAILY
COMMUNITY
Start: 2022-07-22

## 2022-11-07 RX ORDER — UBROGEPANT 100 MG/1
TABLET ORAL
COMMUNITY
Start: 2022-09-08

## 2022-11-07 RX ORDER — ALPRAZOLAM 0.25 MG/1
TABLET ORAL
COMMUNITY
Start: 2022-05-12

## 2022-11-07 RX ORDER — DOXYCYCLINE HYCLATE 50 MG/1
50 CAPSULE ORAL DAILY
COMMUNITY
Start: 2022-06-28

## 2022-11-07 RX ORDER — CYCLOSPORINE 0 G/ML
1 SOLUTION/ DROPS OPHTHALMIC; TOPICAL 2 TIMES DAILY
COMMUNITY
Start: 2022-07-15

## 2022-11-07 RX ORDER — PROGESTERONE 100 MG/1
100 CAPSULE ORAL NIGHTLY
COMMUNITY

## 2022-11-16 LAB — HPV I/H RISK 1 DNA SPEC QL NAA+PROBE: NEGATIVE

## 2023-10-05 ENCOUNTER — OFFICE VISIT (OUTPATIENT)
Dept: OBGYN CLINIC | Facility: CLINIC | Age: 53
End: 2023-10-05
Payer: COMMERCIAL

## 2023-10-05 VITALS
SYSTOLIC BLOOD PRESSURE: 102 MMHG | WEIGHT: 172.63 LBS | HEART RATE: 56 BPM | DIASTOLIC BLOOD PRESSURE: 68 MMHG | BODY MASS INDEX: 29 KG/M2

## 2023-10-05 DIAGNOSIS — Z12.31 ENCOUNTER FOR SCREENING MAMMOGRAM FOR BREAST CANCER: ICD-10-CM

## 2023-10-05 DIAGNOSIS — Z01.419 WELL WOMAN EXAM WITH ROUTINE GYNECOLOGICAL EXAM: Primary | ICD-10-CM

## 2023-10-05 PROCEDURE — 3078F DIAST BP <80 MM HG: CPT | Performed by: NURSE PRACTITIONER

## 2023-10-05 PROCEDURE — 3074F SYST BP LT 130 MM HG: CPT | Performed by: NURSE PRACTITIONER

## 2023-10-05 PROCEDURE — 99396 PREV VISIT EST AGE 40-64: CPT | Performed by: NURSE PRACTITIONER

## 2023-10-05 RX ORDER — PROGESTERONE 100 MG/1
50 CAPSULE ORAL NIGHTLY
COMMUNITY

## 2023-10-05 NOTE — PROGRESS NOTES
Here for Routine Annual Exam  No concerns or questions. Menses are regular. Contraception- none. Last mammogram 8/2022, pap 11/2022, colonoscopy 11/2020  Still seeing functional medicine for HRT    ROS: No Cardiac, Respiratory, GI,  or Neurological symptoms. PE:  GENERAL: well developed, well nourished, in no apparent distress  SKIN: no rashes, no suspicious lesions  HEENT: normal  NECK: supple; no thyroidmegaly, no adenopathy  LUNGS: clear to auscultation  CARDIOVASCULAR: normal S1, S2, RRR  BREASTS: firm, nontendder, no palpable masses or nodes, no nipple discharge, no skin changes, no axillary adenopathy,    ABDOMEN: Soft, non distended; non tender, no masses  GYNE/: External Genitalia: Normal without lesions or erythema                      Vagina: normal without lesions, scant discharge                      Uterus: mid, mobile, non tender, normal size                     Cervix: no lesions or CMT                     Adnexa: non tender, no masses, normal size  EXTREMITIES:  non tender without edema    A/P:   1. Well woman exam with routine gynecological exam  Pap deferred  Increase calcium intake and weight bearing exercises    2. Encounter for screening mammogram for breast cancer  Regular self breast exams recommended  - [facility] JOHNSON 2D+3D SCREENING BILAT (CPT=77067/55621);  Future       Return to clinic 1 year for routine exam, or as needed with any concerns or question

## 2023-11-21 ENCOUNTER — TELEPHONE (OUTPATIENT)
Dept: OBGYN CLINIC | Facility: CLINIC | Age: 53
End: 2023-11-21

## 2023-11-21 NOTE — TELEPHONE ENCOUNTER
Received screening mammogram results from [facility]. Results are normal (BI-RADS category: 1 - Negative) and recommendation for repeat in 1 year noted. [employer] Maintenance updated to show completion date of 11/13/23 and next date due. Report placed in JAVAN Daily's mailbox in [place] for review.

## 2023-11-30 ENCOUNTER — MED REC SCAN ONLY (OUTPATIENT)
Dept: OBGYN CLINIC | Facility: CLINIC | Age: 53
End: 2023-11-30

## 2025-02-02 ENCOUNTER — APPOINTMENT (OUTPATIENT)
Dept: CT IMAGING | Facility: HOSPITAL | Age: 55
End: 2025-02-02
Attending: EMERGENCY MEDICINE
Payer: COMMERCIAL

## 2025-02-02 ENCOUNTER — APPOINTMENT (OUTPATIENT)
Dept: ULTRASOUND IMAGING | Facility: HOSPITAL | Age: 55
End: 2025-02-02
Attending: EMERGENCY MEDICINE
Payer: COMMERCIAL

## 2025-02-02 ENCOUNTER — HOSPITAL ENCOUNTER (EMERGENCY)
Facility: HOSPITAL | Age: 55
Discharge: HOME | End: 2025-02-02
Attending: EMERGENCY MEDICINE
Payer: COMMERCIAL

## 2025-02-02 VITALS
HEART RATE: 66 BPM | BODY MASS INDEX: 29.99 KG/M2 | HEIGHT: 65 IN | TEMPERATURE: 99 F | SYSTOLIC BLOOD PRESSURE: 137 MMHG | OXYGEN SATURATION: 98 % | RESPIRATION RATE: 16 BRPM | DIASTOLIC BLOOD PRESSURE: 79 MMHG | WEIGHT: 180 LBS

## 2025-02-02 DIAGNOSIS — N83.202 CYST OF LEFT OVARY: ICD-10-CM

## 2025-02-02 DIAGNOSIS — R10.32 LLQ PAIN: Primary | ICD-10-CM

## 2025-02-02 LAB
ALBUMIN SERPL-MCNC: 4.4 G/DL (ref 3.2–4.8)
ALBUMIN/GLOB SERPL: 1.8 {RATIO} (ref 1–2)
ALP LIVER SERPL-CCNC: 63 U/L
ALT SERPL-CCNC: 9 U/L
ANION GAP SERPL CALC-SCNC: 8 MMOL/L (ref 0–18)
AST SERPL-CCNC: 15 U/L (ref ?–34)
BASOPHILS # BLD AUTO: 0.05 X10(3) UL (ref 0–0.2)
BASOPHILS NFR BLD AUTO: 0.7 %
BILIRUB SERPL-MCNC: 0.7 MG/DL (ref 0.3–1.2)
BUN BLD-MCNC: 11 MG/DL (ref 9–23)
CALCIUM BLD-MCNC: 9.4 MG/DL (ref 8.7–10.6)
CHLORIDE SERPL-SCNC: 102 MMOL/L (ref 98–112)
CO2 SERPL-SCNC: 27 MMOL/L (ref 21–32)
CREAT BLD-MCNC: 0.87 MG/DL
EGFRCR SERPLBLD CKD-EPI 2021: 79 ML/MIN/1.73M2 (ref 60–?)
EOSINOPHIL # BLD AUTO: 0.13 X10(3) UL (ref 0–0.7)
EOSINOPHIL NFR BLD AUTO: 1.7 %
ERYTHROCYTE [DISTWIDTH] IN BLOOD BY AUTOMATED COUNT: 12 %
GLOBULIN PLAS-MCNC: 2.5 G/DL (ref 2–3.5)
GLUCOSE BLD-MCNC: 103 MG/DL (ref 70–99)
HCG SERPL QL: NEGATIVE
HCT VFR BLD AUTO: 38.6 %
HGB BLD-MCNC: 13.6 G/DL
IMM GRANULOCYTES # BLD AUTO: 0.03 X10(3) UL (ref 0–1)
IMM GRANULOCYTES NFR BLD: 0.4 %
LYMPHOCYTES # BLD AUTO: 1.46 X10(3) UL (ref 1–4)
LYMPHOCYTES NFR BLD AUTO: 19.2 %
MCH RBC QN AUTO: 31.3 PG (ref 26–34)
MCHC RBC AUTO-ENTMCNC: 35.2 G/DL (ref 31–37)
MCV RBC AUTO: 88.9 FL
MONOCYTES # BLD AUTO: 0.5 X10(3) UL (ref 0.1–1)
MONOCYTES NFR BLD AUTO: 6.6 %
NEUTROPHILS # BLD AUTO: 5.44 X10 (3) UL (ref 1.5–7.7)
NEUTROPHILS # BLD AUTO: 5.44 X10(3) UL (ref 1.5–7.7)
NEUTROPHILS NFR BLD AUTO: 71.4 %
OSMOLALITY SERPL CALC.SUM OF ELEC: 284 MOSM/KG (ref 275–295)
PLATELET # BLD AUTO: 229 10(3)UL (ref 150–450)
POTASSIUM SERPL-SCNC: 4.2 MMOL/L (ref 3.5–5.1)
PROT SERPL-MCNC: 6.9 G/DL (ref 5.7–8.2)
RBC # BLD AUTO: 4.34 X10(6)UL
SODIUM SERPL-SCNC: 137 MMOL/L (ref 136–145)
WBC # BLD AUTO: 7.6 X10(3) UL (ref 4–11)

## 2025-02-02 PROCEDURE — 85025 COMPLETE CBC W/AUTO DIFF WBC: CPT | Performed by: EMERGENCY MEDICINE

## 2025-02-02 PROCEDURE — 76856 US EXAM PELVIC COMPLETE: CPT | Performed by: EMERGENCY MEDICINE

## 2025-02-02 PROCEDURE — 74177 CT ABD & PELVIS W/CONTRAST: CPT | Performed by: EMERGENCY MEDICINE

## 2025-02-02 PROCEDURE — 93975 VASCULAR STUDY: CPT | Performed by: EMERGENCY MEDICINE

## 2025-02-02 PROCEDURE — 80053 COMPREHEN METABOLIC PANEL: CPT | Performed by: EMERGENCY MEDICINE

## 2025-02-02 PROCEDURE — 96374 THER/PROPH/DIAG INJ IV PUSH: CPT

## 2025-02-02 PROCEDURE — 99284 EMERGENCY DEPT VISIT MOD MDM: CPT

## 2025-02-02 PROCEDURE — 99285 EMERGENCY DEPT VISIT HI MDM: CPT

## 2025-02-02 PROCEDURE — 84703 CHORIONIC GONADOTROPIN ASSAY: CPT | Performed by: EMERGENCY MEDICINE

## 2025-02-02 PROCEDURE — 76830 TRANSVAGINAL US NON-OB: CPT | Performed by: EMERGENCY MEDICINE

## 2025-02-02 RX ORDER — KETOROLAC TROMETHAMINE 15 MG/ML
15 INJECTION, SOLUTION INTRAMUSCULAR; INTRAVENOUS ONCE
Status: COMPLETED | OUTPATIENT
Start: 2025-02-02 | End: 2025-02-02

## 2025-02-02 NOTE — ED PROVIDER NOTES
Patient Seen in: [facility] Emergency Department      History     Chief Complaint   Patient presents with    Abdomen/Flank Pain     3 days worsening    Pain     3 days     Stated Complaint: L Ovary cyst rupture    Subjective:   HPI      Patient has had 3 days of worsening left lower quadrant pain.  She has a known ovarian cyst there.  She is due for repeat ultrasound on Tuesday to have it size reevaluated, she states her gynecologist is considering offering surgery to remove it to prevent torsion.  No fevers or chills or nausea or vomiting or urinary symptoms    No back or flank pain      Objective:     Past Medical History:    Abnormal ultrasound of breast    Anxiety state, unspecified    panic attacks, PTSD post fathers death    Colitis    due to Cryptosporidium    Endometriosis    H. pylori infection    H/O mammogram    negative    Helicobacter pylori (H. 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 Wilson [facility]    Colonoscopy  11/07/2020    TI/colon wnl, random bx taken    Colonoscopy N/A 11/7/2020    Procedure: COLONOSCOPY, POSSIBLE BIOPSY, POSSIBLE POLYPECTOMY 17437;  Surgeon: Oliver Strong MD;  Location: [facility]    Colposcopy, cervix w/upper adjacent vagina; w/biopsy(s), cervix      1995    Cryocautery of cervix      1995    Laparoscopy,diagnostic  01/01/1995    laparotomy and laparoscopy for endometriosis    Upper gi endoscopy,diagnosis  05/13/2014    Normal, gastric and SB Bx taken                Social History     Socioeconomic History    Marital status:    Tobacco Use    Smoking status: Never    Smokeless tobacco: Never   Vaping Use    Vaping  status: Never Used   Substance and Sexual Activity    Alcohol use: Yes     Alcohol/week: 1.0 - 2.0 standard drink of alcohol     Types: 1 - 2 Standard drinks or equivalent per week     Comment: socially    Drug use: No    Sexual activity: Yes     Partners: Male   Other Topics Concern    Caffeine Concern Yes     Comment: 1 cup of coffee    Exercise Yes     Comment: walking a lot    Seat Belt Yes     Social Drivers of Health     Food Insecurity: Low Risk  (5/8/2022)    Received from [facility]    Food Insecurity     Have there been times that your food ran out, and you didn't have money to get more?: No     Are there times that you worry that this might happen?: No   Transportation Needs: Low Risk  (5/8/2022)    Received from [facility]    Transportation Needs     Do you have trouble getting transportation to medical appointments?: No   Housing Stability: Low Risk  (5/8/2022)    Received from [facility]    Housing Stability     Are you concerned about having a safe and reliable place to live?: No                  Physical Exam     ED Triage Vitals [02/02/25 0755]   /61   Pulse 74   Resp 16   Temp 98.8 °F (37.1 °C)   Temp src Temporal   SpO2 97 %   O2 Device None (Room air)       Current Vitals:   Vital Signs  BP: 127/61  Pulse: 81  Resp: 16  Temp: 98.8 °F (37.1 °C)  Temp src: Temporal    Oxygen Therapy  SpO2: 99 %  O2 Device: None (Room air)        Physical Exam  Physical Exam   Constitutional: Awake, alert, well appearing  Head: Normocephalic and atraumatic.   Eyes: Conjunctivae are normal. Pupils are equal, round, and reactive to light.   Neck: Normal range of motion. No JVD  Cardiovascular: Normal rate, regular rhythm  Pulmonary/Chest: Normal effort.  No accessory muscle use.  No cyanosis.  Abdominal: Soft. Not distended.  Neurological: Pt is  alert and oriented to person, place, and time. no cranial nerve deficits. Speech fluent      Belly benign multiple exams    ED Course     Labs Reviewed   COMP METABOLIC PANEL (14) - Abnormal; Notable for the following components:       Result Value    Glucose 103 (*)     ALT 9 (*)     All other components within normal limits   HCG, BETA SUBUNIT, QUAL - Normal   CBC WITH DIFFERENTIAL WITH PLATELET   RAINBOW DRAW LAVENDER   RAINBOW DRAW LIGHT GREEN              Blood work reassuring    CT ABDOMEN+PELVIS(CONTRAST ONLY)(CPT=74177)    Result Date: 2/2/2025  CONCLUSION:  1. There is trace free fluid in the pelvis which is most likely a physiologic finding. 2. 2.4 cm enhancing nodule right uterine fundus is consistent with uterine fibroid. 3. There is no other acute abnormality.    LOCATION:  [facility]   Dictated by (CST): Enrique Chapman MD on 2/02/2025 at 10:25 AM     Finalized by (CST): Enrique Chapman MD on 2/02/2025 at 10:35 AM       US PELVIS EV W DOPPLER (CPT=76856/22708/82292)    Result Date: 2/2/2025  CONCLUSION:   1. No evidence of torsion.  2. Uterine fibroid is noted.    LOCATION:  [place] [id number]     Dictated by (CST): Malachi Maddox MD on 2/02/2025 at 9:51 AM     Finalized by (CST): Malachi Maddox MD on 2/02/2025 at 9:53 AM             MDM          Differential diagnoses considered: Surgical emergency such as ovarian cyst, ruptured diverticulitis, kidney stone all considered    In reviewing previous records from [facility], her cyst fairly recently measured larger than 4 cm.  Today it is notably smaller suspect that the cyst may have ruptured and may be involuting.   CT scan shows no other source of pain.  Recommend NSAIDs, close outpatient follow-up      I visualized the radiology studies, my independent interpretation: Ovarian torsion is not suggested on ultrasound    *External charts reviewed: As noted above  *Additional sources of history: n/a    Shared decision making was done by: patient, myself.            Medical  Decision Making      Disposition and Plan     Clinical Impression:  1. LLQ pain    2. Cyst of left ovary         Disposition:  Discharge  2/2/2025 10:43 am    Follow-up:  Vaughn Ramirez DO  [address] [place] [address] [phone]    Follow up            Medications Prescribed:  Current Discharge Medication List              Supplementary Documentation:

## 2025-02-02 NOTE — ED INITIAL ASSESSMENT (HPI)
Patient reports a cyst on her left ovary that has been growing since November. However since Friday, she reports increased pain and now diffuse pain spreading across abdominal and feeling some abdominal distention and back pain. Denies dysuria or fever.

## (undated) NOTE — MR AVS SNAPSHOT
After Visit Summary   7/3/2020    Dereje Ingram    MRN: [id number]           Visit Information     Date & Time  7/3/2020 10:00 AM Provider  [facility] Department  [facility], [place] Dept.  Phone  [phone] 7/9/2021 9:00 AM Chuyita Butler, [address] [facility]       Follow-up Instructions    Return in about 1 year (around 7/3/2021).      Imaging Scheduling Instructions     Around July 3, 2020   Imaging:   [facility] 2D+3D SCREENING conditions such as allergies, colds, cough, fever, rash, sore throat, headache and pink eye. The cost for a Video Visit is currently $35.         If you receive a survey from [facility], please take a few minutes to complete it and provide feedback.  We s Conditions needing urgent attention, but are   non-life-threatening. Also available by appointment Average cost  $120*     EMERGENCY ROOM Life-threatening emergencies needing immediate intervention at a hospital emergency room.  Average cost  $2,300*   *